# Patient Record
Sex: FEMALE | Race: OTHER | HISPANIC OR LATINO | URBAN - METROPOLITAN AREA
[De-identification: names, ages, dates, MRNs, and addresses within clinical notes are randomized per-mention and may not be internally consistent; named-entity substitution may affect disease eponyms.]

---

## 2021-12-28 ENCOUNTER — OFFICE VISIT (OUTPATIENT)
Dept: URBAN - METROPOLITAN AREA CLINIC 60 | Facility: CLINIC | Age: 51
End: 2021-12-28

## 2022-07-09 ENCOUNTER — TELEPHONE ENCOUNTER (OUTPATIENT)
Dept: URBAN - METROPOLITAN AREA CLINIC 121 | Facility: CLINIC | Age: 52
End: 2022-07-09

## 2022-07-09 RX ORDER — OMEPRAZOLE 20 MG/1
ONCE A DAY CAPSULE, DELAYED RELEASE ORAL ONCE A DAY
Refills: 2 | OUTPATIENT
Start: 2019-12-06 | End: 2020-03-25

## 2022-07-09 RX ORDER — SUCRALFATE 1 G/1
TABLET ORAL TWICE A DAY
Refills: 0 | OUTPATIENT
Start: 2019-08-19 | End: 2019-08-19

## 2022-07-09 RX ORDER — SUCRALFATE 1 G/1
TAKE 1 TABLET BY MOUTH 2 TIMES A DAY TABLET ORAL
Refills: 0 | OUTPATIENT
Start: 2019-09-04 | End: 2019-10-08

## 2022-07-09 RX ORDER — OMEPRAZOLE 20 MG/1
ONCE A DAY CAPSULE, DELAYED RELEASE ORAL ONCE A DAY
Refills: 3 | OUTPATIENT
Start: 2019-06-25 | End: 2019-08-19

## 2022-07-09 RX ORDER — OMEPRAZOLE 20 MG/1
CAPSULE, DELAYED RELEASE ORAL
Refills: 0 | OUTPATIENT
Start: 2019-06-18 | End: 2019-08-19

## 2022-07-09 RX ORDER — SUCRALFATE 1 G/1
TAKE 1 TABLET BY MOUTH 2 TIMES A DAY TABLET ORAL
Refills: 0 | OUTPATIENT
Start: 2019-08-01 | End: 2019-08-19

## 2022-07-09 RX ORDER — OMEPRAZOLE 20 MG/1
ONCE A DAY CAPSULE, DELAYED RELEASE ORAL ONCE A DAY
Refills: 0 | OUTPATIENT
Start: 2020-03-25 | End: 2020-07-15

## 2022-07-09 RX ORDER — OMEPRAZOLE 20 MG/1
CAPSULE, DELAYED RELEASE ORAL TWICE A DAY
Refills: 0 | OUTPATIENT
Start: 2019-08-19 | End: 2019-08-19

## 2022-07-10 ENCOUNTER — TELEPHONE ENCOUNTER (OUTPATIENT)
Dept: URBAN - METROPOLITAN AREA CLINIC 121 | Facility: CLINIC | Age: 52
End: 2022-07-10

## 2022-07-10 RX ORDER — SUCRALFATE 1 G/1
TWICE A DAY TABLET ORAL TWICE A DAY
Refills: 0 | Status: ACTIVE | COMMUNITY
Start: 2019-10-08

## 2022-07-10 RX ORDER — OMEPRAZOLE 20 MG/1
ONCE A DAY CAPSULE, DELAYED RELEASE ORAL ONCE A DAY
Refills: 3 | Status: ACTIVE | COMMUNITY
Start: 2019-08-19

## 2022-07-10 RX ORDER — OMEPRAZOLE 20 MG/1
ONCE A DAY CAPSULE, DELAYED RELEASE ORAL ONCE A DAY
Refills: 0 | Status: ACTIVE | COMMUNITY
Start: 2020-07-15

## 2022-07-10 RX ORDER — SUCRALFATE 1 G/1
TWICE A DAY TABLET ORAL TWICE A DAY
Refills: 0 | Status: ACTIVE | COMMUNITY
Start: 2019-06-25

## 2022-07-10 RX ORDER — SUCRALFATE 1 G/1
TAKE 1 TABLET BY MOUTH 2 TIMES A DAY TABLET ORAL
Refills: 0 | Status: ACTIVE | COMMUNITY
Start: 2019-10-31

## 2025-05-23 ENCOUNTER — TELEPHONE ENCOUNTER (OUTPATIENT)
Dept: URBAN - METROPOLITAN AREA CLINIC 60 | Facility: CLINIC | Age: 55
End: 2025-05-23

## 2025-05-23 ENCOUNTER — P2P PATIENT RECORD (OUTPATIENT)
Age: 55
End: 2025-05-23

## 2025-05-29 ENCOUNTER — LAB OUTSIDE AN ENCOUNTER (OUTPATIENT)
Dept: URBAN - METROPOLITAN AREA CLINIC 60 | Facility: CLINIC | Age: 55
End: 2025-05-29

## 2025-05-29 ENCOUNTER — OFFICE VISIT (OUTPATIENT)
Dept: URBAN - METROPOLITAN AREA CLINIC 60 | Facility: CLINIC | Age: 55
End: 2025-05-29
Payer: COMMERCIAL

## 2025-05-29 ENCOUNTER — DASHBOARD ENCOUNTERS (OUTPATIENT)
Age: 55
End: 2025-05-29

## 2025-05-29 ENCOUNTER — P2P PATIENT RECORD (OUTPATIENT)
Age: 55
End: 2025-05-29

## 2025-05-29 DIAGNOSIS — R13.14 PHARYNGOESOPHAGEAL DYSPHAGIA: ICD-10-CM

## 2025-05-29 DIAGNOSIS — K21.9 GASTROESOPHAGEAL REFLUX DISEASE WITHOUT ESOPHAGITIS: ICD-10-CM

## 2025-05-29 PROBLEM — 266435005: Status: ACTIVE | Noted: 2025-05-29

## 2025-05-29 PROBLEM — 40739000: Status: ACTIVE | Noted: 2025-05-29

## 2025-05-29 PROCEDURE — 99204 OFFICE O/P NEW MOD 45 MIN: CPT | Performed by: NURSE PRACTITIONER

## 2025-05-29 RX ORDER — VONOPRAZAN FUMARATE 26.72 MG/1
1 TABLET TABLET ORAL ONCE A DAY
Qty: 30 | OUTPATIENT
Start: 2025-05-29 | End: 2025-06-28

## 2025-05-29 RX ORDER — FAMOTIDINE 40 MG/1
1 TABLET TABLET, FILM COATED ORAL ONCE A DAY
Status: ACTIVE | COMMUNITY

## 2025-05-29 RX ORDER — LISINOPRIL 5 MG/1
2 TABLETS TABLET ORAL ONCE A DAY
Status: ACTIVE | COMMUNITY

## 2025-05-29 RX ORDER — ALBUTEROL SULFATE AND BUDESONIDE 90; 80 UG/1; UG/1
INHALE TWO PUFFS BY MOUTH AS NEEDED . NOT TO EXCEED 12 PUFFS IN 24 HOURS AEROSOL, METERED RESPIRATORY (INHALATION)
Qty: 32.1 UNSPECIFIED | Refills: 0 | Status: ACTIVE | COMMUNITY

## 2025-05-29 RX ORDER — ACETYLCYSTEINE 200 MG/ML
INHALE 3MLS BY NEBULIZATION THREE TIMES A DAY SOLUTION ORAL; RESPIRATORY (INHALATION)
Qty: 30 UNSPECIFIED | Refills: 0 | Status: ACTIVE | COMMUNITY

## 2025-05-29 NOTE — HPI-TODAY'S VISIT:
05/25 Patient here today in good general state.  She is here complaining of having severe symptoms of reflux and dysphagia. Her symptoms started couple months ago, but now seems to be worse.  Patient has medical history of GERD, gastritis, and hiatal hernia.  Her last EGD was done on 2019.  Patient will do diet for gastritis and GERD, start on Voquezna 20 mg once a day, will have esophagram, swallow evaluation, and EGD.

## 2025-05-29 NOTE — PHYSICAL EXAM CONSTITUTIONAL:
well developed, well nourished , in no acute distress , ambulating without difficulty , normal communication ability
<<-----Click here for Discharge Medication Review

## 2025-06-24 ENCOUNTER — CLAIMS CREATED FROM THE CLAIM WINDOW (OUTPATIENT)
Dept: URBAN - METROPOLITAN AREA SURGERY CENTER 4 | Facility: SURGERY CENTER | Age: 55
End: 2025-06-24

## 2025-06-24 ENCOUNTER — CLAIMS CREATED FROM THE CLAIM WINDOW (OUTPATIENT)
Dept: URBAN - METROPOLITAN AREA SURGERY CENTER 4 | Facility: SURGERY CENTER | Age: 55
End: 2025-06-24
Payer: COMMERCIAL

## 2025-06-24 ENCOUNTER — CLAIMS CREATED FROM THE CLAIM WINDOW (OUTPATIENT)
Dept: URBAN - METROPOLITAN AREA CLINIC 4 | Facility: CLINIC | Age: 55
End: 2025-06-24
Payer: COMMERCIAL

## 2025-06-24 DIAGNOSIS — K29.60 OTHER GASTRITIS WITHOUT BLEEDING: ICD-10-CM

## 2025-06-24 DIAGNOSIS — K21.9 GASTRO-ESOPHAGEAL REFLUX DISEASE WITHOUT ESOPHAGITIS: ICD-10-CM

## 2025-06-24 DIAGNOSIS — K44.9 DIAPHRAGMATIC HERNIA WITHOUT OBSTRUCTION OR GANGRENE: ICD-10-CM

## 2025-06-24 DIAGNOSIS — K29.70 GASTRITIS, UNSPECIFIED, WITHOUT BLEEDING: ICD-10-CM

## 2025-06-24 DIAGNOSIS — K31.89 OTHER DISEASES OF STOMACH AND DUODENUM: ICD-10-CM

## 2025-06-24 PROCEDURE — 43239 EGD BIOPSY SINGLE/MULTIPLE: CPT | Performed by: INTERNAL MEDICINE

## 2025-06-24 PROCEDURE — 88312 SPECIAL STAINS GROUP 1: CPT | Performed by: PATHOLOGY

## 2025-06-24 PROCEDURE — 88305 TISSUE EXAM BY PATHOLOGIST: CPT | Performed by: PATHOLOGY

## 2025-06-24 RX ORDER — VONOPRAZAN FUMARATE 26.72 MG/1
1 TABLET TABLET ORAL ONCE A DAY
Qty: 30 | Status: ACTIVE | COMMUNITY
Start: 2025-05-29 | End: 2025-06-28

## 2025-06-24 RX ORDER — ALBUTEROL SULFATE AND BUDESONIDE 90; 80 UG/1; UG/1
INHALE TWO PUFFS BY MOUTH AS NEEDED . NOT TO EXCEED 12 PUFFS IN 24 HOURS AEROSOL, METERED RESPIRATORY (INHALATION)
Qty: 32.1 UNSPECIFIED | Refills: 0 | Status: ACTIVE | COMMUNITY

## 2025-06-24 RX ORDER — ACETYLCYSTEINE 200 MG/ML
INHALE 3MLS BY NEBULIZATION THREE TIMES A DAY SOLUTION ORAL; RESPIRATORY (INHALATION)
Qty: 30 UNSPECIFIED | Refills: 0 | Status: ACTIVE | COMMUNITY

## 2025-06-24 RX ORDER — FAMOTIDINE 40 MG/1
1 TABLET TABLET, FILM COATED ORAL ONCE A DAY
Status: ACTIVE | COMMUNITY

## 2025-06-24 RX ORDER — LISINOPRIL 5 MG/1
2 TABLETS TABLET ORAL ONCE A DAY
Status: ACTIVE | COMMUNITY

## 2025-07-08 ENCOUNTER — LAB OUTSIDE AN ENCOUNTER (OUTPATIENT)
Dept: URBAN - METROPOLITAN AREA CLINIC 60 | Facility: CLINIC | Age: 55
End: 2025-07-08

## 2025-07-08 ENCOUNTER — OFFICE VISIT (OUTPATIENT)
Dept: URBAN - METROPOLITAN AREA CLINIC 60 | Facility: CLINIC | Age: 55
End: 2025-07-08
Payer: COMMERCIAL

## 2025-07-08 DIAGNOSIS — K21.9 GASTROESOPHAGEAL REFLUX DISEASE WITHOUT ESOPHAGITIS: ICD-10-CM

## 2025-07-08 DIAGNOSIS — R10.11 RUQ ABDOMINAL PAIN: ICD-10-CM

## 2025-07-08 DIAGNOSIS — L98.9 LESION OF NECK: ICD-10-CM

## 2025-07-08 DIAGNOSIS — R59.9 SWELLING OF LYMPH NODE: ICD-10-CM

## 2025-07-08 DIAGNOSIS — K82.8 DYSKINESIA OF GALLBLADDER: ICD-10-CM

## 2025-07-08 DIAGNOSIS — R13.14 PHARYNGOESOPHAGEAL DYSPHAGIA: ICD-10-CM

## 2025-07-08 PROBLEM — 81680005: Status: ACTIVE | Noted: 2025-07-08

## 2025-07-08 PROCEDURE — 99214 OFFICE O/P EST MOD 30 MIN: CPT | Performed by: NURSE PRACTITIONER

## 2025-07-08 RX ORDER — FAMOTIDINE 20 MG/1
1 TABLET AT BEDTIME AS NEEDED TABLET, FILM COATED ORAL ONCE A DAY
Qty: 90 | Refills: 0 | OUTPATIENT
Start: 2025-07-08

## 2025-07-08 RX ORDER — ALBUTEROL SULFATE AND BUDESONIDE 90; 80 UG/1; UG/1
INHALE TWO PUFFS BY MOUTH AS NEEDED . NOT TO EXCEED 12 PUFFS IN 24 HOURS AEROSOL, METERED RESPIRATORY (INHALATION)
Qty: 32.1 UNSPECIFIED | Refills: 0 | Status: ACTIVE | COMMUNITY

## 2025-07-08 RX ORDER — LISINOPRIL 5 MG/1
2 TABLETS TABLET ORAL ONCE A DAY
Status: ACTIVE | COMMUNITY

## 2025-07-08 NOTE — HPI-TODAY'S VISIT:
05/25 Patient here today in good general state.  She is here complaining of having severe symptoms of reflux and dysphagia. Her symptoms started couple months ago, but now seems to be worse.  Patient has medical history of GERD, gastritis, and hiatal hernia.  Her last EGD was done on 2019.  Patient will do diet for gastritis and GERD, start on Voquezna 20 mg once a day, will have esophagram, swallow evaluation, and EGD.  07/25 Patient's EGD shows evidence of  small hiatal hernia, chronic gastritis, normal esophagus and examined duodenum Biopsy results show evidence of duodenal mucosa with no significant abnormality.  Stomach, antrum, body biopsy shows evidence of chemical/reactive gastropathy, negative for H. pylori organism, intestinal metaplasia, dysplasia, malignancy.  Lower third esophageal mucosa with changes due to reflux.  Negative for Sher's esophagus, dysplasia, malignancy.  Today patient is complaining of dysphagia, symptoms of GERD and gastritis, right upper quadrant abdominal pain, ( she said that she has been having problem with her gallbladder / past medical history of gallbladder sludge ).  Also, she complains of pain over the left side of the neck, at physical exam there is a hard nodule, painful, not movable, around 2 cm anteriorly to the sternocleidomastoid. Swallow evaluation and esophagram are pending. Patient will have HIDA scan and right upper quadrant ultrasound.  Soft tissue neck ultrasound. Continue with diet for gastritis and GERD.  Treatment with famotidine 20 mg once a day.  Unfortunately her medical insurance did not approve her treatment with Voquezna yet.

## 2025-07-16 ENCOUNTER — LAB OUTSIDE AN ENCOUNTER (OUTPATIENT)
Dept: URBAN - METROPOLITAN AREA CLINIC 63 | Facility: CLINIC | Age: 55
End: 2025-07-16

## 2025-08-08 ENCOUNTER — OFFICE VISIT (OUTPATIENT)
Dept: URBAN - METROPOLITAN AREA CLINIC 60 | Facility: CLINIC | Age: 55
End: 2025-08-08

## 2025-08-08 RX ORDER — ALBUTEROL SULFATE AND BUDESONIDE 90; 80 UG/1; UG/1
INHALE TWO PUFFS BY MOUTH AS NEEDED . NOT TO EXCEED 12 PUFFS IN 24 HOURS AEROSOL, METERED RESPIRATORY (INHALATION)
Qty: 32.1 UNSPECIFIED | Refills: 0 | Status: ACTIVE | COMMUNITY

## 2025-08-08 RX ORDER — LISINOPRIL 5 MG/1
2 TABLETS TABLET ORAL ONCE A DAY
Status: ACTIVE | COMMUNITY

## 2025-08-08 RX ORDER — FAMOTIDINE 20 MG/1
1 TABLET AT BEDTIME AS NEEDED TABLET, FILM COATED ORAL ONCE A DAY
Qty: 90 | Refills: 0 | Status: ACTIVE | COMMUNITY
Start: 2025-07-08

## 2025-08-15 ENCOUNTER — LAB OUTSIDE AN ENCOUNTER (OUTPATIENT)
Dept: URBAN - METROPOLITAN AREA CLINIC 63 | Facility: CLINIC | Age: 55
End: 2025-08-15